# Patient Record
Sex: FEMALE | Race: WHITE
[De-identification: names, ages, dates, MRNs, and addresses within clinical notes are randomized per-mention and may not be internally consistent; named-entity substitution may affect disease eponyms.]

---

## 2017-02-16 ENCOUNTER — HOSPITAL ENCOUNTER (OUTPATIENT)
Dept: HOSPITAL 80 - CIMAGING | Age: 42
End: 2017-02-16
Attending: OBSTETRICS & GYNECOLOGY
Payer: COMMERCIAL

## 2017-02-16 DIAGNOSIS — R10.2: Primary | ICD-10-CM

## 2017-12-20 ENCOUNTER — HOSPITAL ENCOUNTER (OUTPATIENT)
Dept: HOSPITAL 80 - BMCIMAGING | Age: 42
End: 2017-12-20
Attending: INTERNAL MEDICINE
Payer: COMMERCIAL

## 2017-12-20 DIAGNOSIS — Z12.31: Primary | ICD-10-CM

## 2017-12-20 PROCEDURE — G0202 SCR MAMMO BI INCL CAD: HCPCS

## 2018-08-26 ENCOUNTER — HOSPITAL ENCOUNTER (EMERGENCY)
Dept: HOSPITAL 80 - FED | Age: 43
Discharge: HOME | End: 2018-08-26
Payer: COMMERCIAL

## 2018-08-26 VITALS — SYSTOLIC BLOOD PRESSURE: 129 MMHG | DIASTOLIC BLOOD PRESSURE: 81 MMHG

## 2018-08-26 DIAGNOSIS — S61.201A: Primary | ICD-10-CM
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
## 2018-08-26 NOTE — EDPHY
H & P


Time Seen by Provider: 08/26/18 16:56


HPI/ROS: 





CHIEF COMPLAINT:  Left index finger skin avulsion





HISTORY OF PRESENT ILLNESS: 43-year-old female via private vehicle with up-to-

date tetanus complaining of skin avulsion to left index finger when she was 

cutting cilantro.  Occurred shortly prior to arrival.  











************


PHYSICAL EXAM





(Prior to examination, patient consented to physical exam, hands were washed 

and my usual and customary physical exam procedures followed)


1) GENERAL: Well-developed, well-nourished, alert and oriented.  Appears to be 

in no acute distress.


2) HEAD: Normocephalic


3) HEENT: sclera anicteric   


4) LUNGS: Breathing comfortably.   


5) SKIN:   Left index finger distal phalanx superficial skin avulsion on the 

volar aspect that does not involve the nail bed with no laceration.  This is 

very superficial.  


6) MUSCULOSKELETAL:  No shortening or malrotation   


7) NEUROLOGIC:  Full sensation.














Smoking Status: Current some day smoker


Constitutional: 





 Initial Vital Signs











Temperature (C)  36.7 C   08/26/18 16:32


 


Heart Rate  79   08/26/18 16:32


 


Respiratory Rate  18   08/26/18 16:32


 


Blood Pressure  129/81 H  08/26/18 16:32


 


O2 Sat (%)  98   08/26/18 16:32











Allergies/Adverse Reactions: 


 





No Known Allergies Allergy (Unverified 08/26/18 16:34)


 








Home Medications: 














 Medication  Instructions  Recorded


 


Amphet Asp and D/Amphet [Adderall 10 mg PO 08/26/18





10 MG (*)]  


 


Levothyroxine [Synthroid 75 mcg 75 mcg PO DAILY06 08/26/18





(*)]  














MDM/Departure





- MDM


Procedures: 





Procedure:  Wound management


 


I explained the indications, risks and benefits for anesthetic administration. 

Verbal consent was obtained from the patient.  The left index finger was 

anesthetized using 0.5% bupivicaine without epinephrine digital nerve block. 

After anesthetic administered the patient was observed for a period of time and 

had no apparent adverse effects.  Surgicel dressing placed.


ED Course/Re-evaluation: 





Patient has superficial skin avulsion.  She brought with her the piece of 

tissue which is avulsed.  However this is a very superficial nonviable piece of 

tissue.  Wound be allowed to heal via secondary intention.  She feels 

comfortable being discharged.  Usual and customary wound precautions and 

instructions provided.  I saw this patient independently based on established 

practice protocols.  Care of patient under supervision of  secondary 

supervising physician Dr Cruz .





- Depart


Disposition: Home, Routine, Self-Care


Clinical Impression: 


Avulsion of skin of finger


Qualifiers:


 Encounter type: initial encounter Qualified Code(s): S61.209A - Unspecified 

open wound of unspecified finger without damage to nail, initial encounter





Condition: Good


Instructions:  Skin Avulsion (ED)


Additional Instructions: 


Return to the ER if you develop redness, swelling, discharge, warmth to the 

wound, red streaks going up your arm, or any other symptoms that concern you.


Referrals: 


Belinda Mehta MD [Primary Care Provider] - 2-3 days, call for appt.

## 2018-12-21 ENCOUNTER — HOSPITAL ENCOUNTER (OUTPATIENT)
Dept: HOSPITAL 80 - FIMAGING | Age: 43
End: 2018-12-21
Attending: INTERNAL MEDICINE
Payer: COMMERCIAL

## 2018-12-21 DIAGNOSIS — Z12.31: Primary | ICD-10-CM
